# Patient Record
Sex: MALE | Race: AMERICAN INDIAN OR ALASKA NATIVE | URBAN - METROPOLITAN AREA
[De-identification: names, ages, dates, MRNs, and addresses within clinical notes are randomized per-mention and may not be internally consistent; named-entity substitution may affect disease eponyms.]

---

## 2020-09-11 ENCOUNTER — CONSULT (OUTPATIENT)
Dept: URBAN - METROPOLITAN AREA CLINIC 44 | Facility: CLINIC | Age: 70
End: 2020-09-11
Payer: COMMERCIAL

## 2020-09-11 PROCEDURE — 92133 CPTRZD OPH DX IMG PST SGM ON: CPT | Performed by: OPHTHALMOLOGY

## 2020-09-11 PROCEDURE — 92004 COMPRE OPH EXAM NEW PT 1/>: CPT | Performed by: OPHTHALMOLOGY

## 2020-09-11 PROCEDURE — 92250 FUNDUS PHOTOGRAPHY W/I&R: CPT | Performed by: OPHTHALMOLOGY

## 2020-09-11 PROCEDURE — 92083 EXTENDED VISUAL FIELD XM: CPT | Performed by: OPHTHALMOLOGY

## 2020-09-11 ASSESSMENT — INTRAOCULAR PRESSURE
OD: 18
OS: 18

## 2021-01-07 ENCOUNTER — NEW PATIENT (OUTPATIENT)
Dept: URBAN - METROPOLITAN AREA CLINIC 51 | Facility: CLINIC | Age: 71
End: 2021-01-07
Payer: COMMERCIAL

## 2021-01-07 DIAGNOSIS — H52.4 PRESBYOPIA: ICD-10-CM

## 2021-01-07 DIAGNOSIS — H40.1131 PRIMARY OPEN-ANGLE GLAUCOMA, BILATERAL, MILD STAGE: Primary | ICD-10-CM

## 2021-01-07 DIAGNOSIS — H43.813 VITREOUS DEGENERATION, BILATERAL: ICD-10-CM

## 2021-01-07 DIAGNOSIS — H04.123 TEAR FILM INSUFFICIENCY OF BILATERAL LACRIMAL GLANDS: ICD-10-CM

## 2021-01-07 DIAGNOSIS — Z96.1 PRESENCE OF INTRAOCULAR LENS: ICD-10-CM

## 2021-01-07 PROCEDURE — 92134 CPTRZ OPH DX IMG PST SGM RTA: CPT | Performed by: OPTOMETRIST

## 2021-01-07 PROCEDURE — 92014 COMPRE OPH EXAM EST PT 1/>: CPT | Performed by: OPTOMETRIST

## 2021-01-07 RX ORDER — DORZOLAMIDE HYDROCHLORIDE AND TIMOLOL MALEATE 20; 5 MG/ML; MG/ML
SOLUTION/ DROPS OPHTHALMIC
Qty: 15 | Refills: 3 | Status: INACTIVE
Start: 2021-01-07 | End: 2021-06-14

## 2021-01-07 ASSESSMENT — INTRAOCULAR PRESSURE
OD: 17
OS: 16

## 2021-01-07 ASSESSMENT — VISUAL ACUITY
OD: 20/20
OS: 20/20

## 2021-01-07 ASSESSMENT — KERATOMETRY
OD: 43.35
OS: 43.13

## 2021-06-14 ENCOUNTER — OFFICE VISIT (OUTPATIENT)
Dept: URBAN - METROPOLITAN AREA CLINIC 51 | Facility: CLINIC | Age: 71
End: 2021-06-14
Payer: COMMERCIAL

## 2021-06-14 PROCEDURE — 92083 EXTENDED VISUAL FIELD XM: CPT | Performed by: OPTOMETRIST

## 2021-06-14 PROCEDURE — 99213 OFFICE O/P EST LOW 20 MIN: CPT | Performed by: OPTOMETRIST

## 2021-06-14 RX ORDER — CARBOXYMETHYLCELLULOSE SODIUM 5 MG/ML
0.5 % SOLUTION/ DROPS OPHTHALMIC
Qty: 90 | Refills: 6 | Status: ACTIVE
Start: 2021-06-14

## 2021-06-14 RX ORDER — DORZOLAMIDE HYDROCHLORIDE AND TIMOLOL MALEATE 20; 5 MG/ML; MG/ML
SOLUTION/ DROPS OPHTHALMIC
Qty: 15 | Refills: 3 | Status: INACTIVE
Start: 2021-06-14 | End: 2021-12-03

## 2021-06-14 ASSESSMENT — INTRAOCULAR PRESSURE
OS: 12
OD: 12

## 2021-06-14 NOTE — IMPRESSION/PLAN
Impression: Primary open-angle glaucoma, mild stage, bilateral
*Patient reports s/p SLT Trabeculoloplasty 8-10 years ago (x2) done elsewhere *Tmax (20/20) *IOPs today 12mmHg OU with Cosopt BID OU. *DIAGNOSED W [facility] AT 26YEARS OLD 
*S/P CATARACT SURGERY IN 2018
+ FAMILY HX OF GLAUCOMA (MOTHER) *PT DENIES SULFA ALLERGY *PT REPORTS SLEEP APNEA - AVOID B-BLOCKERS *HVF 24-2 (6/14/21): OD: high FP; superior lid defect OS: superior lid defect Plan: Patient to continue with Cosopt BID OU. Refilled medication. HVF 24-2 performed and reviewed with patient.  
RTC in 4 months for IOP check with Dr. Miley Daugherty

## 2021-10-08 ENCOUNTER — OFFICE VISIT (OUTPATIENT)
Dept: URBAN - METROPOLITAN AREA CLINIC 51 | Facility: CLINIC | Age: 71
End: 2021-10-08
Payer: COMMERCIAL

## 2021-10-08 PROCEDURE — 99213 OFFICE O/P EST LOW 20 MIN: CPT | Performed by: OPTOMETRIST

## 2021-10-08 ASSESSMENT — INTRAOCULAR PRESSURE
OD: 19
OS: 17
OS: 20
OD: 16

## 2021-10-08 NOTE — IMPRESSION/PLAN
Impression: Primary open-angle glaucoma, mild stage, bilateral
*Patient reports s/p SLT Trabeculoloplasty 8-10 years ago (x2) done elsewhere *Tmax (20/20) *IOPs today 19mmHg OD and 20mmHg OS with Dorzolamide-Timolol BID OU
*DIAGNOSED W [facility] AT 26YEARS OLD 
*S/P CATARACT SURGERY IN 2018
+ FAMILY HX OF GLAUCOMA (MOTHER) *PT DENIES SULFA ALLERGY *PT REPORTS SLEEP APNEA - AVOID B-BLOCKERS *HVF 24-2 (6/14/21): OD: high FP; superior lid defect OS: superior lid defect Plan: The glaucoma seems to be managed well with current gtt regimen. I have reviewed with the patient to continue with current regimen. Pt to keep scheduled appt with Dr. Ghazal English. He may RTC to general for continued care. 
If so, schedule CEE in January with updates with HVF 24-2 and OCT RNFL

## 2021-12-03 ENCOUNTER — OFFICE VISIT (OUTPATIENT)
Dept: URBAN - METROPOLITAN AREA CLINIC 44 | Facility: CLINIC | Age: 71
End: 2021-12-03
Payer: COMMERCIAL

## 2021-12-03 PROCEDURE — 99214 OFFICE O/P EST MOD 30 MIN: CPT | Performed by: OPHTHALMOLOGY

## 2021-12-03 PROCEDURE — 92083 EXTENDED VISUAL FIELD XM: CPT | Performed by: OPHTHALMOLOGY

## 2021-12-03 RX ORDER — DORZOLAMIDE HYDROCHLORIDE AND TIMOLOL MALEATE 20; 5 MG/ML; MG/ML
SOLUTION/ DROPS OPHTHALMIC
Qty: 3 | Refills: 3 | Status: ACTIVE
Start: 2021-12-03

## 2021-12-03 ASSESSMENT — INTRAOCULAR PRESSURE
OS: 10
OD: 12

## 2021-12-03 ASSESSMENT — KERATOMETRY
OS: 43.63
OD: 43.25

## 2021-12-03 NOTE — IMPRESSION/PLAN
Impression: Primary open-angle glaucoma, mild stage, bilateral Plan: PT HAS POAG OU                
PT WAS REFERRED BY THE [facility]
PT WAS DIAGNOSED W [place] [address] AT 26YEARS OLD 
S/P CATARACT SURGERY IN 2018
+ FAMILY HX OF GLAUCOMA (MOTHER) PT DENIES SULFA ALLERGY 
PT REPORTS SLEEP APNEA - AVOID B-BLOCKERS  
TARGET IOP MID TEENS OR LESS 
RECOMMEND : 
1. CONTINUE DORZOLAMIDE /TIMOLOL OU BID 2. PT HAS USED LATANOPROST IN THE PAST 3.  SCHEDULE CE TO ADDRESS HIS CONCERNS ASIDE FROM HIS GLAUCOMA RISK

## 2022-06-24 ENCOUNTER — OFFICE VISIT (OUTPATIENT)
Dept: URBAN - METROPOLITAN AREA CLINIC 44 | Facility: CLINIC | Age: 72
End: 2022-06-24
Payer: COMMERCIAL

## 2022-06-24 DIAGNOSIS — H40.1131 PRIMARY OPEN-ANGLE GLAUCOMA, MILD STAGE, BILATERAL: Primary | ICD-10-CM

## 2022-06-24 PROCEDURE — 99213 OFFICE O/P EST LOW 20 MIN: CPT | Performed by: OPHTHALMOLOGY

## 2022-06-24 ASSESSMENT — INTRAOCULAR PRESSURE
OD: 14
OS: 14

## 2022-06-24 NOTE — IMPRESSION/PLAN
Impression: Primary open-angle glaucoma, mild stage, bilateral Plan: PT HAS POAG OU                
PT WAS REFERRED BY THE [facility]
PT WAS DIAGNOSED WITH  GLAUCOMA AT 26YEARS OF AGE
S/P CATARACT SURGERY IN 2018
+ FAMILY HX OF GLAUCOMA (MOTHER) PT DENIES SULFA ALLERGY 
PT REPORTS SLEEP APNEA - AVOID B-BLOCKERS  
TARGET IOP MID TEENS OR LESS 
RECOMMEND : 
1. CONTINUE DORZOLAMIDE /TIMOLOL OU BID 2. PT HAS USED LATANOPROST IN THE PAST 3. F/U WITH DR ROLLE 6-9 MONTHS

## 2023-04-05 ENCOUNTER — OFFICE VISIT (OUTPATIENT)
Dept: URBAN - METROPOLITAN AREA CLINIC 44 | Facility: CLINIC | Age: 73
End: 2023-04-05
Payer: COMMERCIAL

## 2023-04-05 DIAGNOSIS — H40.1131 PRIMARY OPEN-ANGLE GLAUCOMA, MILD STAGE, BILATERAL: Primary | ICD-10-CM

## 2023-04-05 PROCEDURE — 99213 OFFICE O/P EST LOW 20 MIN: CPT | Performed by: OPHTHALMOLOGY

## 2023-04-05 PROCEDURE — 92083 EXTENDED VISUAL FIELD XM: CPT | Performed by: OPHTHALMOLOGY

## 2023-04-05 PROCEDURE — 92133 CPTRZD OPH DX IMG PST SGM ON: CPT | Performed by: OPHTHALMOLOGY

## 2023-04-05 RX ORDER — DORZOLAMIDE HYDROCHLORIDE AND TIMOLOL MALEATE 20; 5 MG/ML; MG/ML
SOLUTION/ DROPS OPHTHALMIC
Qty: 10 | Refills: 3 | Status: ACTIVE
Start: 2023-04-05

## 2023-04-05 ASSESSMENT — INTRAOCULAR PRESSURE
OS: 16
OD: 18

## 2023-04-05 NOTE — IMPRESSION/PLAN
Impression: Primary open-angle glaucoma, mild stage, bilateral Plan: PT HAS POAG OU                
PT WAS REFERRED BY THE [facility]
PT WAS DIAGNOSED WITH  GLAUCOMA AT 26YEARS OF AGE
S/P CATARACT SURGERY IN 2018
+ FAMILY HX OF GLAUCOMA (MOTHER) PT DENIES SULFA ALLERGY 
PT REPORTS SLEEP APNEA - AVOID B-BLOCKERS  
TARGET IOP MID TEENS OR LESS 
RECOMMEND : 
1. CONTINUE DORZOLAMIDE /TIMOLOL OU BID 2. F/U WITH DR ROLLE 6-9 MONTHS  - VF 04/05/2023       OPTOS 04/05/2023
3.  C/ Harry Dumont 89 AS DIRECTED

## 2023-07-06 ENCOUNTER — OFFICE VISIT (OUTPATIENT)
Dept: URBAN - METROPOLITAN AREA CLINIC 44 | Facility: CLINIC | Age: 73
End: 2023-07-06
Payer: COMMERCIAL

## 2023-07-06 DIAGNOSIS — H02.423 MYOGENIC PTOSIS OF BILATERAL EYELIDS: Primary | ICD-10-CM

## 2023-07-06 PROCEDURE — 92285 EXTERNAL OCULAR PHOTOGRAPHY: CPT | Performed by: OPHTHALMOLOGY

## 2023-07-06 PROCEDURE — 99214 OFFICE O/P EST MOD 30 MIN: CPT | Performed by: OPHTHALMOLOGY

## 2023-07-06 PROCEDURE — 92082 INTERMEDIATE VISUAL FIELD XM: CPT | Performed by: OPHTHALMOLOGY

## 2023-07-06 RX ORDER — ERYTHROMYCIN 5 MG/G
OINTMENT OPHTHALMIC
Qty: 2 | Refills: 1 | Status: ACTIVE
Start: 2023-07-06

## 2023-07-06 NOTE — IMPRESSION/PLAN
Impression: Myogenic ptosis of bilateral eyelids: H02.423. Plan: [place] screen VF & photos were performed and interpreted today and demonstrated restriction of superior and temporal visual fields. This reverted to normal demonstrating >30% increase in visual field with mechanical elevation of the eyelid and brow. The degree of ptosis will require an external levator resection to correct the eyelid ptosis. RBA were discussed with the patient and all questions were answered. Visual field shows restriction of superior and temporal visual fields in resting position. With eyelids/brows elevated/taped there is a significant (>30%) improvement in the superior and temporal visual fields. 

no levator fatigue, skin only OU

## 2023-07-25 ENCOUNTER — ADULT PHYSICAL (OUTPATIENT)
Dept: URBAN - METROPOLITAN AREA CLINIC 44 | Facility: CLINIC | Age: 73
End: 2023-07-25
Payer: COMMERCIAL

## 2023-07-25 DIAGNOSIS — H02.423 MYOGENIC PTOSIS OF BILATERAL EYELIDS: ICD-10-CM

## 2023-07-25 DIAGNOSIS — Z01.818 ENCOUNTER FOR OTHER PREPROCEDURAL EXAMINATION: Primary | ICD-10-CM

## 2023-07-25 PROCEDURE — 99203 OFFICE O/P NEW LOW 30 MIN: CPT | Performed by: PHYSICIAN ASSISTANT

## 2023-08-11 ENCOUNTER — POST-OPERATIVE VISIT (OUTPATIENT)
Dept: URBAN - METROPOLITAN AREA CLINIC 44 | Facility: CLINIC | Age: 73
End: 2023-08-11
Payer: COMMERCIAL

## 2023-08-11 DIAGNOSIS — Z48.89 ENCOUNTER FOR OTHER SPECIFIED SURGICAL AFTERCARE: Primary | ICD-10-CM

## 2023-08-11 PROCEDURE — 99024 POSTOP FOLLOW-UP VISIT: CPT | Performed by: OPTOMETRIST

## 2023-10-04 ENCOUNTER — OFFICE VISIT (OUTPATIENT)
Dept: URBAN - METROPOLITAN AREA CLINIC 44 | Facility: CLINIC | Age: 73
End: 2023-10-04
Payer: COMMERCIAL

## 2023-10-04 DIAGNOSIS — H40.1131 PRIMARY OPEN-ANGLE GLAUCOMA, MILD STAGE, BILATERAL: Primary | ICD-10-CM

## 2023-10-04 PROCEDURE — 99213 OFFICE O/P EST LOW 20 MIN: CPT | Performed by: OPHTHALMOLOGY

## 2023-10-04 RX ORDER — DORZOLAMIDE HYDROCHLORIDE AND TIMOLOL MALEATE 20; 5 MG/ML; MG/ML
SOLUTION/ DROPS OPHTHALMIC
Qty: 10 | Refills: 3 | Status: ACTIVE
Start: 2023-10-04

## 2023-10-04 ASSESSMENT — INTRAOCULAR PRESSURE
OS: 14
OD: 14

## 2024-06-18 ENCOUNTER — OFFICE VISIT (OUTPATIENT)
Dept: URBAN - METROPOLITAN AREA CLINIC 44 | Facility: CLINIC | Age: 74
End: 2024-06-18
Payer: COMMERCIAL

## 2024-06-18 DIAGNOSIS — H43.813 VITREOUS DEGENERATION, BILATERAL: ICD-10-CM

## 2024-06-18 DIAGNOSIS — H04.123 TEAR FILM INSUFFICIENCY OF BILATERAL LACRIMAL GLANDS: ICD-10-CM

## 2024-06-18 DIAGNOSIS — Z96.1 PRESENCE OF INTRAOCULAR LENS: ICD-10-CM

## 2024-06-18 DIAGNOSIS — H40.1131 PRIMARY OPEN-ANGLE GLAUCOMA, MILD STAGE, BILATERAL: Primary | ICD-10-CM

## 2024-06-18 PROCEDURE — 99213 OFFICE O/P EST LOW 20 MIN: CPT

## 2024-06-18 PROCEDURE — 92134 CPTRZ OPH DX IMG PST SGM RTA: CPT

## 2024-06-18 ASSESSMENT — VISUAL ACUITY
OD: 20/20
OS: 20/20

## 2024-06-18 ASSESSMENT — KERATOMETRY
OS: 43.50
OD: 43.88

## 2024-06-18 ASSESSMENT — INTRAOCULAR PRESSURE
OS: 16
OD: 17

## 2025-05-14 ENCOUNTER — OFFICE VISIT (OUTPATIENT)
Dept: URBAN - METROPOLITAN AREA CLINIC 44 | Facility: CLINIC | Age: 75
End: 2025-05-14
Payer: COMMERCIAL

## 2025-05-14 DIAGNOSIS — H40.1131 PRIMARY OPEN-ANGLE GLAUCOMA, MILD STAGE, BILATERAL: Primary | ICD-10-CM

## 2025-05-14 PROCEDURE — 92083 EXTENDED VISUAL FIELD XM: CPT | Performed by: OPHTHALMOLOGY

## 2025-05-14 PROCEDURE — 99213 OFFICE O/P EST LOW 20 MIN: CPT | Performed by: OPHTHALMOLOGY

## 2025-05-14 ASSESSMENT — INTRAOCULAR PRESSURE
OD: 12
OS: 11